# Patient Record
Sex: MALE | Race: BLACK OR AFRICAN AMERICAN | HISPANIC OR LATINO | Employment: FULL TIME | URBAN - METROPOLITAN AREA
[De-identification: names, ages, dates, MRNs, and addresses within clinical notes are randomized per-mention and may not be internally consistent; named-entity substitution may affect disease eponyms.]

---

## 2023-09-30 ENCOUNTER — HOSPITAL ENCOUNTER (EMERGENCY)
Facility: CLINIC | Age: 58
Discharge: HOME | End: 2023-09-30
Attending: EMERGENCY MEDICINE | Admitting: EMERGENCY MEDICINE
Payer: COMMERCIAL

## 2023-09-30 VITALS
HEART RATE: 85 BPM | HEIGHT: 74 IN | SYSTOLIC BLOOD PRESSURE: 158 MMHG | BODY MASS INDEX: 32.73 KG/M2 | TEMPERATURE: 98 F | RESPIRATION RATE: 20 BRPM | DIASTOLIC BLOOD PRESSURE: 90 MMHG | OXYGEN SATURATION: 98 % | WEIGHT: 255 LBS

## 2023-09-30 DIAGNOSIS — M79.644 PAIN OF FINGER OF RIGHT HAND: ICD-10-CM

## 2023-09-30 DIAGNOSIS — L03.019 FELON OF FINGER: ICD-10-CM

## 2023-09-30 PROCEDURE — 99284 EMERGENCY DEPT VISIT MOD MDM: CPT

## 2023-09-30 PROCEDURE — 10060 I&D ABSCESS SIMPLE/SINGLE: CPT

## 2023-09-30 RX ORDER — HYDROCODONE BITARTRATE AND ACETAMINOPHEN 5; 325 MG/1; MG/1
1 TABLET ORAL EVERY 6 HOURS PRN
Qty: 6 TABLET | Refills: 0 | Status: SHIPPED | OUTPATIENT
Start: 2023-09-30 | End: 2023-10-03

## 2023-09-30 RX ORDER — SULFAMETHOXAZOLE/TRIMETHOPRIM 800-160 MG
1 TABLET ORAL 2 TIMES DAILY
Qty: 14 TABLET | Refills: 0 | Status: SHIPPED | OUTPATIENT
Start: 2023-09-30 | End: 2023-10-07

## 2023-09-30 ASSESSMENT — ACTIVITIES OF DAILY LIVING (ADL): ADLS_ACUITY_SCORE: 33

## 2023-09-30 NOTE — ED PROVIDER NOTES
EMERGENCY DEPARTMENT ENCOUNTER      NAME: Lenin Jacobo  YOB: 1965  MRN: [id number]    FINAL IMPRESSION  1. Pain of finger of right hand    2. Felon of finger        MEDICAL DECISION MAKING   Pertinent Labs & Imaging studies reviewed. (See chart for details)    Lenin Jacobo is a 58 year old male who presents for evaluation of right middle finger pain and swelling.  Patient reports 4 to 5-day history of progressively worsening pain and swelling to the distal aspect of right middle finger.  He admits that he that he frequently bites his nails and notes that they will occasionally seem infected but have never become this painful or swollen.  Tonight, the throbbing pain in his finger made it impossible to fall asleep so he decided to come in for evaluation.  He has not had any drainage or bleeding from the area and has no associated numbness, tingling, fever.  He is left-hand dominant.  Tetanus is up-to-date.  Remainder of history and exam, as below.     On exam, patient has fairly significant swelling to the distal phalanx of the right third digit with fluctuance over the radial aspect.  No swelling or significant tenderness or lesions over the pulp of the digit to suggest herpetic marj or pulp space infection.  Patient has not had any trauma to suggest underlying fracture.  Distal CMS is intact so I have low suspicion for neurovascular injury.  Overall, history and exam does seem most consistent with paronychia.  I see no indication for laboratory or imaging work-up.  Discussed options for management with patient.  We have agreed on plan for digital block, incision and drainage, and antibiotics.  Patient drove here today so would like to avoid taking anything stronger than Tylenol/Motrin for pain.  We will hold on p.o. analgesic for now.    I administered a digital block with excellent effect.  After this, used an 11 blade scalpel to dissect along nailbed plane in the area of maximum fluctuance.  I  was able to express a good amount of purulent appearing fluid.  Patient tolerated this well and did have some improvement in throbbing sensation in his digit.  See procedure note for details.  Given degree of swelling and pain, will plan to start on oral antibiotics.  Also encouraged patient to use Tylenol/Motrin, warm soaks, and allow the area to continue draining if it does.    Wound was dressed by ERT and after this, patient was eager to go home.  I did offer first dose of antibiotics here but patient felt comfortable picking these up from the pharmacy, as I also agreed to send him with a few tablets of Norco to cover for pain not relieved with Tylenol/Motrin.    Strict return precautions and follow up recommendations were discussed and all questions were answered. Patient endorsed understanding and was in agreement with plan.        Medical Decision Making    History:  Supplemental history from: Documented in chart, if applicable  External Record(s) reviewed: Documented in chart, if applicable.    Work Up:  Chart documentation includes differential considered and any EKGs or imaging independently interpreted by provider, where specified.  In additional to work up documented, I considered the following work up: Documented in chart, if applicable.    External consultation:  Discussion of management with another provider: Documented in chart, if applicable    Complicating factors:  Care impacted by chronic illness: N/A  Care affected by social determinants of health: Access to Medical Care    Disposition considerations: Discharge. I prescribed additional prescription strength medication(s) as charted. See documentation for any additional details.          ED COURSE  1:06 AM I met with the patient, obtained history, performed an initial exam, and discussed options and plan for diagnostics and treatment here in the ED.   1:28 AM I performed an incision and drainage procedure. Refer to procedure section for further  "details. The patient is comfortable with being discharge after the procedure.        MEDICATIONS GIVEN IN THE ED  Medications - No data to display    NEW PRESCRIPTIONS STARTED AT TODAY'S VISIT  Discharge Medication List as of 9/30/2023  2:13 AM        START taking these medications    Details   HYDROcodone-acetaminophen (NORCO) 5-325 MG tablet Take 1 tablet by mouth every 6 hours as needed for severe pain, Disp-6 tablet, R-0, Local Print      sulfamethoxazole-trimethoprim (BACTRIM DS) 800-160 MG tablet Take 1 tablet by mouth 2 times daily for 7 days, Disp-14 tablet, R-0, Local Print                =================================================================    Chief Complaint   Patient presents with    Hand Pain         HPI:    Patient information was obtained from: The patient    Use of : N/A     Lenin Jacobo is a 58 year old male who presents with hand pain.    The patient developed some swelling and pain to the right middle fingertip for the past 4-5 days. He was unable to sleep tonight due to the pain, thus prompting him to come to the ER. He denied any recent traumas or injury. He notes he does bit his fingernails and has had ingrown nails from doing so. The patient is left handed. He is up to date with all of his vaccination.    Otherwise, the patient denied having drainage from the swelling site, fevers, chills, and any other medical complaints or concerns at this time.      RELEVANT HISTORY, MEDICATIONS, & ALLERGIES   Past medical history, surgical history, family history, medications, and allergies reviewed and pertinent noted in HPI.    REVIEW OF SYSTEMS:  A complete review of systems was performed with pertinent positives and negatives noted in the HPI. All other systems negative.     PHYSICAL EXAM:    Vitals: BP (!) 158/90   Pulse 85   Temp 98  F (36.7  C)   Resp 20   Ht 1.88 m (6' 2\")   Wt 115.7 kg (255 lb)   SpO2 98%   BMI 32.74 kg/m    General: Awake, alert, interactive. "   Eyes: PERRL.   HENT: Atraumatic. MMM.   Neck: Full AROM.  Cardiovascular: Regular rate.  Respiratory/Chest: Normal work of breathing.   Abdomen: Non-distended.   Right hand: Edema, erythema, and tenderness to distal phalanx of third digit.  Fluctuance over radial aspect of digit.  No active drainage or bleeding.  Brisk capillary refill.  Sensation intact all distributions.  Normal range of motion of all joints of all 5 digits.  2+ radial pulse.  Skin: Normal color. No rash or diaphoresis.  Neurologic: Alert, oriented. Speech clear. CN's grossly intact. Moving all extremities spontaneously.   Psychiatric: Normal affect/mood.         PROCEDURES  PROCEDURE: Digital Block   INDICATIONS: Right third digit   PROCEDURE PROVIDER: Dr Taylor Turcios   SITE: Right middle finger (3rd digit)   MEDICATION: 2 mL of 1% Lidocaine without epinephrine   NOTE: The skin overlying the site for injection was prepped with chlorhexidine.  Needle was inserted in a standard three point injection pattern.  Each time the area was aspirated and there was no return of blood.  I then injected the medication at the base of the digit.  The patient had excellent response to the procedure   COMPLICATIONS: Patient tolerated procedure well, without complication       PROCEDURE: Incision and Drainage   INDICATIONS: Paronychia   PROCEDURE PROVIDER: Dr Taylor Turcios   SITE: Right third digit   MEDICATION: 2 mLs of 1% Lidocaine without epinephrine   NOTE: The area was prepped with chlorhexidine and draped off in the usual sterile fashion.  Local anesthetic was injected subcutaneously with anesthesia effects demonstrated prior to proceeding.  The area of maximal fluctuance was opened with a # 11 Blade ([place]) using a Single Straight incision to allow for drainage.  The paronychia was drained.  No Packing was placed.  A sterile dressing was placed over the area.   COMPLEXITY: Simple    Simple = single, furuncle, paronychia, superficial  Complex = multiple or  abscess requiring probing, loculations, packing placement   COMPLICATIONS: Patient tolerated procedure well, without complication           I, Olvin Valera, am serving as a scribe to document services personally performed by Dr. Taylor Turcios based on my observation and the provider's statements to me. I, Taylor Turcios MD attest that Olvin Valera is acting in a scribe capacity, has observed my performance of the services and has documented them in accordance with my direction.    Taylor Turcios M.D.  Emergency Medicine  [facility] EMERGENCY ROOM  [address] [address]  [phone]  Dept: [phone]     Taylor Turcios MD  09/30/23 8545

## 2023-09-30 NOTE — DISCHARGE INSTRUCTIONS
You were seen in the Emergency Department today for finger swelling and infection.     For your pain, you may take one of these 3 medications:  - Acetaminophen (Tylenol) 650 mg every 8 hours (no more than 3000 mg in 24 hours).  - Ibuprofen (Motrin, Advil) 400-600 mg by mouth with food every 6-8 hours (no more than 3200 mg in 24hrs). Studies have shown there is really no benefit to taking more than 400 mg at once!  - Norco every 4-6 hours for the next 24-48 hours. Only use this for severe pain that does not get better with tylenol and ibuprofen. It will likely make you feel drowsy so you should try to take it at night to lessen the pain and help you sleep. This DOES contain 325 mg acetaminophen so if you take it, be sure you are not taking too much tylenol in addition.     You should take ibuprofen and tylenol for baseline pain. Write down the times you are taking both medications to ensure appropriate time in between doses.    If tylenol and ibuprofen are not enough, you can take Norco. This DOES contain 325 mg acetaminophen so if you take it, be sure you are not taking too much tylenol in addition.   Note: Norco is a strong narcotic pain medication that can leadto addiction and should be used carefully.  Please don't take more than the recommended dose and limit your use of this medication as much as possible.  Please don't take this medication with other medications or drugs that cause you to feel sleepy (alcohol, benzodiazepines, etc) because it may impair your ability to breathe.  In addition, please do not take it prior to performing activities such as driving, operating power tools, or other activities that carry a risk of bodily harm.    I am also sending with a prescription for an antibiotic.  You should start taking this tonight and continue as directed.    I would also recommend you soak your hand in warm soapy water and keep a loose dressing over your finger.  It is okay and actually could if it continues  to drain.      Please return to the ER if you experience fever, increasing redness, increasing swelling, increasing pain, and/or for any other new or concerning symptoms, otherwise please follow up with your primary doctor in 2-3 days for recheck.     Below is some information you might find useful.     Thank you for choosing [facility]. It was a pleasure taking care of you today!  - Dr. Taylor Turcios

## 2023-09-30 NOTE — ED TRIAGE NOTES
Pt presents to ED with c/o right middle finger pain around the nail bed that has been present for the past 4-5 days.  Pt is unable to sleep tonight due to pain.  Took tylenol around 1600.     Triage Assessment       Row Name 09/30/23 0043       Triage Assessment (Adult)    Airway WDL WDL       Respiratory WDL    Respiratory WDL WDL       Skin Circulation/Temperature WDL    Skin Circulation/Temperature WDL WDL       Cardiac WDL    Cardiac WDL WDL       Peripheral/Neurovascular WDL    Peripheral Neurovascular WDL WDL       Cognitive/Neuro/Behavioral WDL    Cognitive/Neuro/Behavioral WDL WDL